# Patient Record
(demographics unavailable — no encounter records)

---

## 2024-12-05 NOTE — DISCUSSION/SUMMARY
[Normal Growth] : growth [Normal Development] : development [None] : No medical problems [No Elimination Concerns] : elimination [No Feeding Concerns] : feeding [No Skin Concerns] : skin [Normal Sleep Pattern] : sleep [Family Functioning] : family functioning [Nutrition and Feeding] : nutrition and feeding [Infant Development] : infant development [Oral Health] : oral health [Safety] : safety [No Medications] : ~He/She~ is not on any medications [Parent/Guardian] : parent/guardian [Parental Concerns Addressed] : Parental concerns addressed [] : The components of the vaccine(s) to be administered today are listed in the plan of care. The disease(s) for which the vaccine(s) are intended to prevent and the risks have been discussed with the caretaker.  The risks are also included in the appropriate vaccination information statements which have been provided to the patient's caregiver.  The caregiver has given consent to vaccinate. [FreeTextEntry1] : Flu vaccine declined today. Education offered/provided to patient/family. Beyfortus education   Recommend breastfeeding, 8-12 feedings per day. If formula is needed, use iron fortified infant formula. Introduce single-ingredient foods rich in iron, one at a time. Fruits and vegetables after iron fortified cereal or pureed meats. Give 1-2 TBS of solid food 2-3 times/day.  Incorporate up to 4 oz of water daily in a sippy cup. No juice. When teeth erupt wipe daily with washcloth. When in car, patient should be in rear-facing car seat in back seat. Put baby to sleep on back, in own crib with no loose or soft bedding. Lower crib mattress. Help baby to maintain sleep and feeding routines. . Continue tummy time when awake. Ensure home is safe since baby is now more mobile. Do not use infant walker. Read aloud to baby. Use of SPF 30 or more with reapplication and tick checks every 12 hours when playing outside. OK to use bug spray. Poison control discussed. Water safety discussed. Use of a [facility] approved life jacket with designated water watcher.   Return in 3 months for 9 month well visit.

## 2024-12-05 NOTE — DISCUSSION/SUMMARY
[Normal Growth] : growth [Normal Development] : development [None] : No medical problems [No Elimination Concerns] : elimination [No Feeding Concerns] : feeding [No Skin Concerns] : skin [Normal Sleep Pattern] : sleep [Family Functioning] : family functioning [Nutrition and Feeding] : nutrition and feeding [Infant Development] : infant development [Oral Health] : oral health [Safety] : safety [No Medications] : ~He/She~ is not on any medications [Parent/Guardian] : parent/guardian [Parental Concerns Addressed] : Parental concerns addressed [] : The components of the vaccine(s) to be administered today are listed in the plan of care. The disease(s) for which the vaccine(s) are intended to prevent and the risks have been discussed with the caretaker.  The risks are also included in the appropriate vaccination information statements which have been provided to the patient's caregiver.  The caregiver has given consent to vaccinate. [FreeTextEntry1] : Flu vaccine declined today. Education offered/provided to patient/family. Beyfortus education   Recommend breastfeeding, 8-12 feedings per day. If formula is needed, use iron fortified infant formula. Introduce single-ingredient foods rich in iron, one at a time. Fruits and vegetables after iron fortified cereal or pureed meats. Give 1-2 TBS of solid food 2-3 times/day.  Incorporate up to 4 oz of water daily in a sippy cup. No juice. When teeth erupt wipe daily with washcloth. When in car, patient should be in rear-facing car seat in back seat. Put baby to sleep on back, in own crib with no loose or soft bedding. Lower crib mattress. Help baby to maintain sleep and feeding routines. . Continue tummy time when awake. Ensure home is safe since baby is now more mobile. Do not use infant walker. Read aloud to baby. Use of SPF 30 or more with reapplication and tick checks every 12 hours when playing outside. OK to use bug spray. Poison control discussed. Water safety discussed. Use of a [facility] approved life jacket with designated water watcher.   Return in 3 months for 9 month well visit.

## 2024-12-05 NOTE — HISTORY OF PRESENT ILLNESS
[Parents] : parents [Well-balanced] : well-balanced [Normal] : Normal [In Bassinet/Crib] : sleeps in bassinet/crib [On back] : sleeps on back [Co-sleeping] : no co-sleeping [Loose bedding, pillow, toys, and/or bumpers in crib] : no loose bedding, pillow, toys, and/or bumpers in crib [No] : No cigarette smoke exposure [Water heater temperature set at <120 degrees F] : Water heater temperature set at <120 degrees F [Rear facing car seat in back seat] : Rear facing car seat in back seat [Carbon Monoxide Detectors] : Carbon monoxide detectors at home [Smoke Detectors] : Smoke detectors at home. [de-identified] : Doing well  [NO] : No

## 2024-12-05 NOTE — PHYSICAL EXAM
[Alert] : alert [Acute Distress] : no acute distress [Normocephalic] : normocephalic [Flat Open Anterior [place]] : flat open anterior fontanelle [Red Reflex] : red reflex bilateral [PERRL] : PERRL [Normally Placed Ears] : normally placed ears [Auricles Well Formed] : auricles well formed [Clear Tympanic membranes] : clear tympanic membranes [Light reflex present] : light reflex present [Bony landmarks visible] : bony landmarks visible [Discharge] : no discharge [Nares Patent] : nares patent [Palate Intact] : palate intact [Uvula Midline] : uvula midline [Tooth Eruption] : no tooth eruption [Supple, full passive range of motion] : supple, full passive range of motion [Palpable Masses] : no palpable masses [Symmetric Chest Rise] : symmetric chest rise [Clear to Auscultation Bilaterally] : clear to auscultation bilaterally [Regular Rate and Rhythm] : regular rate and rhythm [S1, S2 present] : S1, S2 present [Murmurs] : no murmurs [+2 Femoral Pulses] : (+) 2 femoral pulses [Soft] : soft [Tender] : nontender [Distended] : nondistended [Bowel Sounds] : bowel sounds present [Hepatomegaly] : no hepatomegaly [Splenomegaly] : no splenomegaly [Central Urethral Opening] : central urethral opening [Testicles Descended] : testicles descended bilaterally [Patent] : patent [Normally Placed] : normally placed [No Abnormal Lymph Nodes Palpated] : no abnormal lymph nodes palpated [Macedo-Ortolani] : negative Macedo-Ortolani [Allis Sign] : negative Allis sign [Symmetric Buttocks Creases] : symmetric buttocks creases [Spinal Dimple] : no spinal dimple [Tuft of Hair] : no tuft of hair [Plantar Grasp] : plantar grasp reflex present [Cranial Nerves Grossly Intact] : cranial nerves grossly intact [Rash or Lesions] : no rash/lesions

## 2024-12-05 NOTE — HISTORY OF PRESENT ILLNESS
[Parents] : parents [Well-balanced] : well-balanced [Normal] : Normal [In Bassinet/Crib] : sleeps in bassinet/crib [On back] : sleeps on back [Co-sleeping] : no co-sleeping [Loose bedding, pillow, toys, and/or bumpers in crib] : no loose bedding, pillow, toys, and/or bumpers in crib [No] : No cigarette smoke exposure [Water heater temperature set at <120 degrees F] : Water heater temperature set at <120 degrees F [Rear facing car seat in back seat] : Rear facing car seat in back seat [Carbon Monoxide Detectors] : Carbon monoxide detectors at home [Smoke Detectors] : Smoke detectors at home. [de-identified] : Doing well  [NO] : No

## 2024-12-05 NOTE — PHYSICAL EXAM
[Alert] : alert [Acute Distress] : no acute distress [Normocephalic] : normocephalic [Flat Open Anterior [place]] : flat open anterior fontanelle [Red Reflex] : red reflex bilateral [PERRL] : PERRL [Normally Placed Ears] : normally placed ears [Auricles Well Formed] : auricles well formed [Clear Tympanic membranes] : clear tympanic membranes [Light reflex present] : light reflex present [Bony landmarks visible] : bony landmarks visible [Discharge] : no discharge [Nares Patent] : nares patent [Palate Intact] : palate intact [Uvula Midline] : uvula midline [Tooth Eruption] : no tooth eruption [Supple, full passive range of motion] : supple, full passive range of motion [Palpable Masses] : no palpable masses [Symmetric Chest Rise] : symmetric chest rise [Clear to Auscultation Bilaterally] : clear to auscultation bilaterally [Regular Rate and Rhythm] : regular rate and rhythm [S1, S2 present] : S1, S2 present [Murmurs] : no murmurs [+2 Femoral Pulses] : (+) 2 femoral pulses [Soft] : soft [Tender] : nontender [Distended] : nondistended [Bowel Sounds] : bowel sounds present [Hepatomegaly] : no hepatomegaly [Splenomegaly] : no splenomegaly [Central Urethral Opening] : central urethral opening [Testicles Descended] : testicles descended bilaterally [Patent] : patent [Normally Placed] : normally placed [No Abnormal Lymph Nodes Palpated] : no abnormal lymph nodes palpated [Macedo-Ortolani] : negative Macedo-Ortolani [Allis Sign] : negative Allis sign [Symmetric Buttocks Creases] : symmetric buttocks creases [Spinal Dimple] : no spinal dimple [Tuft of Hair] : no tuft of hair [Plantar Grasp] : plantar grasp reflex present [Cranial Nerves Grossly Intact] : cranial nerves grossly intact [Rash or Lesions] : no rash/lesions

## 2024-12-09 NOTE — PHYSICAL EXAM
[No Abnormal Lymph Nodes Palpated] : no abnormal lymph nodes palpated [Moves All Extremities x 4] : moves all extremities x4 [Warm, Well Perfused x4] : warm, well perfused x4 [Capillary Refill <2s] : capillary refill < 2s [Normotonic] : normotonic [NL] : warm, clear [Warm] : warm [Clear] : clear [Erythematous Oropharynx] : nonerythematous oropharynx [de-identified] : small flesh-colored papule under right axillae with no surrounding erythema, not fluid filled

## 2024-12-09 NOTE — HISTORY OF PRESENT ILLNESS
[de-identified] : follow up ER for head inj and bump on abd  [FreeTextEntry6] : Fell yesterday from about 1-2ft height from the couch, landed on his back. Taken to [facility] ED, monitored in the ED for 4 hours and sent home. No LOC, vomiting, nausea. Has been eating and behaving normally.  Also with bump on the side of his chest, present for about 1.5 weeks with no change in appearance or size. Not itchy. No initial scratch at lesion or bite. No history of keloids.

## 2024-12-09 NOTE — DISCUSSION/SUMMARY
[FreeTextEntry1] : Pt with normal neuro exam after witnessed fall yesterday. Continue to monitor for development of any new or concerning symptoms.  Pt with likely molluscum contagiosum of the chest. Can use topical salicylic acid, but provided reassurance on self-resolving nature of molluscum. Can refer to Dermatology if needed for cryotherapy. Return precautions provided.

## 2025-02-21 NOTE — DISCUSSION/SUMMARY
[FreeTextEntry1] : Baby presenting for 9m WCC, gaining weight and developing beautifully. Continue breast milk or formula as desired. Increase table foods, 3 meals with 2-3 snacks per day. Incorporate up to 6 oz of fluorinated water daily in a sippy cup. Discussed weaning of bottle and pacifier. Wipe teeth daily with washcloth. When in car, patient should be in rear-facing car seat in back seat. Put baby to sleep in own crib with no loose or soft bedding. Lower crib mattress. Help baby to maintain consistent daily routines and sleep schedule. Recognize stranger anxiety. Ensure home is safe since baby is increasingly mobile. Be within arm's reach of baby at all times. Use consistent, positive discipline. Avoid screen time. Read aloud to baby.  UTD on vaccines. Will see again in 3 months for 12m WCC.

## 2025-02-21 NOTE — HISTORY OF PRESENT ILLNESS
[Parents] : parents [Breast milk] : breast milk [Normal] : Normal [In Crib] : sleeps in crib [Co-sleeping] : co-sleeping [Brushing teeth] : brushing teeth [Water heater temperature set at <120 degrees F] : Water heater temperature set at <120 degrees F [Rear facing car seat in  back seat] : Rear facing car seat in  back seat [Carbon Monoxide Detectors] : Carbon monoxide detectors [Smoke Detectors] : Smoke detectors [Up to date] : Up to date

## 2025-02-21 NOTE — PHYSICAL EXAM
[Alert] : alert [Normocephalic] : normocephalic [Flat Open Anterior [place]] : flat open anterior fontanelle [Red Reflex] : red reflex bilateral [PERRL] : PERRL [Normally Placed Ears] : normally placed ears [Auricles Well Formed] : auricles well formed [Clear Tympanic membranes] : clear tympanic membranes [Light reflex present] : light reflex present [Bony landmarks visible] : bony landmarks visible [Nares Patent] : nares patent [Palate Intact] : palate intact [Uvula Midline] : uvula midline [Supple, full passive range of motion] : supple, full passive range of motion [Symmetric Chest Rise] : symmetric chest rise [Clear to Auscultation Bilaterally] : clear to auscultation bilaterally [Regular Rate and Rhythm] : regular rate and rhythm [S1, S2 present] : S1, S2 present [+2 Femoral Pulses] : (+) 2 femoral pulses [Soft] : soft [Bowel Sounds] : bowel sounds present [Central Urethral Opening] : central urethral opening [Testicles Descended] : testicles descended bilaterally [No Abnormal Lymph Nodes Palpated] : no abnormal lymph nodes palpated [Symmetric Abduction and Rotation of hips] : symmetric abduction and rotation of hips [Straight] : straight [Cranial Nerves Grossly Intact] : cranial nerves grossly intact [Acute Distress] : no acute distress [Excessive Tearing] : no excessive tearing [Discharge] : no discharge [Palpable Masses] : no palpable masses [Murmurs] : no murmurs [Tender] : nontender [Distended] : nondistended [Hepatomegaly] : no hepatomegaly [Splenomegaly] : no splenomegaly [Allis Sign] : negative Allis sign [Rash or Lesions] : no rash/lesions [de-identified] : small flesh-colored papule on chest